# Patient Record
Sex: FEMALE
[De-identification: names, ages, dates, MRNs, and addresses within clinical notes are randomized per-mention and may not be internally consistent; named-entity substitution may affect disease eponyms.]

---

## 2018-05-17 ENCOUNTER — HOSPITAL ENCOUNTER (EMERGENCY)
Dept: HOSPITAL 97 - ER | Age: 58
Discharge: HOME | End: 2018-05-17
Payer: COMMERCIAL

## 2018-05-17 DIAGNOSIS — R07.9: Primary | ICD-10-CM

## 2018-05-17 DIAGNOSIS — Z72.0: ICD-10-CM

## 2018-05-17 DIAGNOSIS — E03.9: ICD-10-CM

## 2018-05-17 LAB
ALBUMIN SERPL BCP-MCNC: 4.5 G/DL (ref 3.2–5.5)
ALP SERPL-CCNC: 82 IU/L (ref 42–121)
ALT SERPL W P-5'-P-CCNC: 15 IU/L (ref 10–60)
AST SERPL W P-5'-P-CCNC: 19 IU/L (ref 10–42)
BUN BLD-MCNC: 16 MG/DL (ref 6–20)
GLUCOSE SERPLBLD-MCNC: 94 MG/DL (ref 65–120)
HCT VFR BLD CALC: 36.9 % (ref 36–45)
INR BLD: 1
LYMPHOCYTES # SPEC AUTO: 1.2 K/UL (ref 0.7–4.9)
MAGNESIUM SERPL-MCNC: 1.9 MG/DL (ref 1.8–2.5)
MCH RBC QN AUTO: 32.1 PG (ref 27–35)
MCV RBC: 94.1 FL (ref 80–100)
PMV BLD: 9.4 FL (ref 7.6–11.3)
POTASSIUM SERPL-SCNC: 3.2 MEQ/L (ref 3.6–5)
RBC # BLD: 3.92 M/UL (ref 3.86–4.86)

## 2018-05-17 PROCEDURE — 80076 HEPATIC FUNCTION PANEL: CPT

## 2018-05-17 PROCEDURE — 99285 EMERGENCY DEPT VISIT HI MDM: CPT

## 2018-05-17 PROCEDURE — 80048 BASIC METABOLIC PNL TOTAL CA: CPT

## 2018-05-17 PROCEDURE — 83735 ASSAY OF MAGNESIUM: CPT

## 2018-05-17 PROCEDURE — 85025 COMPLETE CBC W/AUTO DIFF WBC: CPT

## 2018-05-17 PROCEDURE — 84484 ASSAY OF TROPONIN QUANT: CPT

## 2018-05-17 PROCEDURE — 93005 ELECTROCARDIOGRAM TRACING: CPT

## 2018-05-17 PROCEDURE — 81003 URINALYSIS AUTO W/O SCOPE: CPT

## 2018-05-17 PROCEDURE — 36415 COLL VENOUS BLD VENIPUNCTURE: CPT

## 2018-05-17 PROCEDURE — 71045 X-RAY EXAM CHEST 1 VIEW: CPT

## 2018-05-17 PROCEDURE — 85610 PROTHROMBIN TIME: CPT

## 2018-05-17 PROCEDURE — 96374 THER/PROPH/DIAG INJ IV PUSH: CPT

## 2018-05-17 PROCEDURE — 83880 ASSAY OF NATRIURETIC PEPTIDE: CPT

## 2018-05-17 NOTE — ER
Nurse's Notes                                                                                     

 [facility]                                                                

Name: Massiel Givens                                                                             

Age: 57 yrs                                                                                       

Sex: Female                                                                                       

: 1960                                                                                   

MRN: [id number]                                                                                   

Arrival Date: 2018                                                                          

Time: 18:33                                                                                       

Account#: [id number]                                                                            

Bed 15                                                                                            

Private MD: out of town, doctor                                                                   

Diagnosis: Chest pain, unspecified                                                                

                                                                                                  

Presentation:                                                                                     

                                                                                             

18:39 Presenting complaint: Patient states: Sternal chest pain that started this AM, reports  aj  

      radiation to back and jaw. Took Aspirin 81 mg PTA. Transition of care: patient was not      

      received from another setting of care. Onset of symptoms was May 17, 2018. Care prior       

      to arrival: None.                                                                           

18:39 Method Of Arrival: Ambulatory                                                           aj  

18:39 Acuity: NURIA 3                                                                           aj  

18:55 Initial Sepsis Screen: Does the patient meet any 2 criteria? No. Patient's initial      hj  

      sepsis screen is negative. Does the patient have a suspected source of infection? No.       

      Patient's initial sepsis screen is negative.                                                

                                                                                                  

Triage Assessment:                                                                                

18:40 General: Appears in no apparent distress. comfortable, Behavior is calm, cooperative,   aj  

      appropriate for age. Pain: Complains of pain in chest Pain radiates to back. Neuro:         

      Level of Consciousness is awake, alert, obeys commands, Oriented to person, place,          

      time, situation, Appropriate for age. Cardiovascular: Reports chest pain.                   

      Cardiovascular: Reports nausea, Capillary refill < 3 seconds in bilateral fingers           

      Patient's skin is warm and dry. Respiratory: Airway is patent Respiratory effort is         

      even, unlabored, Respiratory pattern is regular, symmetrical. GI: Reports nausea. Derm:     

      Skin is intact, is healthy with good turgor, Skin is pink, warm \T\ dry. normal.            

                                                                                                  

OB/GYN:                                                                                           

18:40 LMP N/A - Post-menopause                                                                aj  

                                                                                                  

Historical:                                                                                       

- Allergies:                                                                                      

18:40 No Known Allergies;                                                                     aj  

- Home Meds:                                                                                      

18:40 Synthroid Oral [Active];                                                                aj  

- PMHx:                                                                                           

18:40 Hypothyroidism;                                                                         aj  

- PSHx:                                                                                           

18:40 ; Cholecystectomy; Appendectomy;                                               aj  

                                                                                                  

- Immunization history:: Adult Immunizations up to date.                                          

- Social history:: Smoking status: Patient uses tobacco products, denies chronic                  

  smoking, but will smoke occasionally.                                                           

                                                                                                  

                                                                                                  

Screenin:55 Abuse screen: Denies threats or abuse. Denies injuries from another. Nutritional        em  

      screening: No deficits noted. Tuberculosis screening: No symptoms or risk factors           

      identified. Fall Risk None identified.                                                      

                                                                                                  

Assessment:                                                                                       

18:56 Pain: Pain began 1 day ago.                                                             hj  

18:57 General: Appears in no apparent distress. uncomfortable, Behavior is calm, cooperative, hj  

      appropriate for age. Pain: Complains of pain in chest. Pain: Pain radiates to back.         

      Neuro: Level of Consciousness is awake, alert, obeys commands, Oriented to person,          

      place, time, situation, Appropriate for age Cardiovascular: Chest pain. Respiratory:        

      Airway is patent Respiratory effort is even, unlabored, Respiratory pattern is regular,     

      symmetrical. GI: No signs and/or symptoms were reported involving the gastrointestinal      

      system. : No signs and/or symptoms were reported regarding the genitourinary system.      

      EENT: No signs and/or symptoms were reported regarding the EENT system. Derm: No signs      

      and/or symptoms reported regarding the dermatologic system. Musculoskeletal: No signs       

      and/or symptoms reported regarding the musculoskeletal system.                              

19:10 Reassessment: Report received from HUGO Wood.                                           bs1 

19:10 General: Appears in no apparent distress. uncomfortable, Behavior is cooperative,       bs1 

      anxious. Pain: Complains of pain in back and chest Pain radiates to back. Neuro: Level      

      of Consciousness is awake, alert, obeys commands, Oriented to person, place, time,          

      situation, Appropriate for age. Cardiovascular: Reports chest pain, Heart tones S1 S2       

      present Capillary refill < 3 seconds Patient's skin is warm and dry. Chest pain.            

      Respiratory: Airway is patent Trachea midline Respiratory effort is even, unlabored,        

      Respiratory pattern is regular, symmetrical, Breath sounds are clear bilaterally. GI:       

      No signs and/or symptoms were reported involving the gastrointestinal system. Abdomen       

      is round non-distended. : No signs and/or symptoms were reported regarding the            

      genitourinary system. EENT: No signs and/or symptoms were reported regarding the EENT       

      system. Derm: Skin is intact, Skin is pink, warm \T\ dry. Musculoskeletal: Circulation,     

      motion, and sensation intact. Capillary refill < 3 seconds, Range of motion: intact in      

      all extremities.                                                                            

20:29 Reassessment: Patient appears in no apparent distress at this time. Patient and/or      bs1 

      family updated on plan of care and expected duration. Pain level reassessed. Patient is     

      alert, oriented x 3, equal unlabored respirations, skin warm/dry/pink. Patient appears      

      anxious on pending results. Informed patient of POC, sending labs and that the Provider     

      will come and talk to patient regarding results.                                            

22:15 Reassessment: Patient appears in no apparent distress at this time. Patient and/or      bs1 

      family updated on plan of care and expected duration. Pain level reassessed. Patient is     

      alert, oriented x 3, equal unlabored respirations, skin warm/dry/pink. No reports of        

      chest pain.                                                                                 

                                                                                                  

Vital Signs:                                                                                      

18:40  / 69; Pulse 80; Resp 16; Temp 98.1; Pulse Ox 98% on R/A; Weight 77.11 kg; Height aj  

      5 ft. 3 in. (160.02 cm);                                                                    

19:40  / 79; Pulse 72; Pulse Ox 99% on R/A;                                             bs1 

20:15  / 70; Pulse 74; Resp 16; Pulse Ox 99% on R/A; Pain 0/10;                         bs1 

21:15  / 74; Pulse 69; Resp 20; Pulse Ox 99% on R/A;                                    bs1 

22:27  / 68; Pulse 72; Resp 21 S; Temp 97.9(O); Pulse Ox 98% on R/A; Pain 0/10;         bs1 

18:40 Body Mass Index 30.11 (77.11 kg, 160.02 cm)                                               

                                                                                                  

ED Course:                                                                                        

18:33 Patient arrived in ED.                                                                  mr  

18:33 out of town, doctor is Private Physician.                                               mr  

18:40 Triage completed.                                                                       aj  

18:40 Arm band placed on right wrist. Patient placed in waiting room, Patient notified of       

      wait time.                                                                                  

18:42 Israel Zhao RN is Primary Nurse.                                                    hj  

18:47 Stefano Savage PA is PHCP.                                                               jr8 

18:47 Gino Fuentes MD is Attending Physician.                                                jr8 

18:56 Cardiac monitor on. Pulse ox on. NIBP on.                                               hj  

18:56 Patient maintains SpO2 saturation greater than 95% on room air.                         hj  

18:57 Patient has correct armband on for positive identification. Placed in gown. Bed in low  hj  

      position. Call light in reach. Side rails up X 1. Adult w/ patient.                         

19:18 Inserted saline lock: 20 gauge in right forearm, using aseptic technique. By sal Major1 

      Christie.                                                                                     

19:21 Primary Nurse role handed off by Pavel, Israel, RN                                     rg2 

19:31 Wen Morton, RN is Primary Nurse.                                                 bs1 

20:02 X-ray completed. Portable x-ray completed in exam room. Patient tolerated procedure     kc2 

      well.                                                                                       

20:03 XRAY Chest (1 view) In Process Unspecified.                                             EDMS

21:33 EKG done, by ED staff, reviewed by Stefano MARTINEZ repeat EKG. Repeat lab(s) drawn. by   bs1 

      me, sent to lab. Repeat troponin.                                                           

22:26 No provider procedures requiring assistance completed. IV discontinued, bleeding        bs1 

      controlled, No redness/swelling at site. Pressure dressing applied.                         

                                                                                                  

Administered Medications:                                                                         

21:47 Drug: Pepcid 20 mg Route: IVP; Site: right antecubital;                                 ea  

22:01 Follow up: Response: No adverse reaction                                                bs1 

                                                                                                  

                                                                                                  

Outcome:                                                                                          

22:11 Discharge ordered by MD.                                                                jr8 

22:26 Discharged to home ambulatory, with significant other.                                  bs1 

22:26 Condition: stable                                                                           

22:26 Discharge instructions given to patient, Instructed on discharge instructions, follow       

      up and referral plans. medication usage, Demonstrated understanding of instructions,        

      follow-up care, medications, Prescriptions given X 1, prescription for prednisone 20mg      

      tab po daily for 5 days.                                                                    

22:31 Patient left the ED.                                                                    bs1 

                                                                                                  

Signatures:                                                                                       

Dispatcher [employer]                           EDMS                                                 

Yue Louise                               rg2                                                  

Kenisha Lloyd RN RN aj Rivera, Maria                                                                                   

Clarke, Rik, LVN                       LVN  Stefano Wen PA PA   jr8                                                  

Israel Zhao, RN                      Pati Oreilly                                 kc2                                                  

Rajani June RN RN ea Salazar, Brittany, HUGO ARIAS   bs1                                                  

                                                                                                  

**************************************************************************************************

## 2018-05-17 NOTE — RAD REPORT
EXAM DESCRIPTION:  RAD - Chest Single View - 5/17/2018 8:05 pm

 

CLINICAL HISTORY:  Chest pain

 

COMPARISON:  None.

 

TECHNIQUE:  AP portable chest image was obtained 2003 hours .

 

FINDINGS:  Lungs are clear. Heart and vasculature are normal. No measurable pleural effusion and no p
neumothorax. No gross bony abnormality seen. No acute aortic findings suspected.

 

IMPRESSION:  No acute cardiopulmonary process.

## 2018-05-17 NOTE — EDPHYS
Physician Documentation                                                                           

 [facility]                                                                

Name: Massiel Givens                                                                             

Age: 57 yrs                                                                                       

Sex: Female                                                                                       

: 1960                                                                                   

MRN: [id number]                                                                                   

Arrival Date: 2018                                                                          

Time: 18:33                                                                                       

Account#: [id number]                                                                            

Bed 15                                                                                            

Private MD: out of town, doctor                                                                   

ED Physician Gino Fuentes                                                                         

HPI:                                                                                              

                                                                                             

20:27 This 57 yrs old  Female presents to ER via Ambulatory with complaints of Chest  jr8 

      Pain.                                                                                       

20:27 The patient or guardian reports chest pain that is located primarily in the substernal  jr8 

      area. Onset: acutely, this morning, today. The pain radiates to jaw, back. Associated       

      signs and symptoms: The patient has no apparent associated signs or symptoms. The chest     

      pain is described as burning. Duration: The patient or guardian reports a single            

      episode. Modifying factors: The symptoms are alleviated by nothing. the symptoms are        

      aggravated by nothing. Severity of pain: At its worst the pain was mild in the              

      emergency department the pain is unchanged. The patient has not experienced similar         

      symptoms in the past. The patient has not recently seen a physician.                        

                                                                                                  

OB/GYN:                                                                                           

18:40 LMP N/A - Post-menopause                                                                aj  

                                                                                                  

Historical:                                                                                       

- Allergies:                                                                                      

18:40 No Known Allergies;                                                                     aj  

- Home Meds:                                                                                      

18:40 Synthroid Oral [Active];                                                                aj  

- PMHx:                                                                                           

18:40 Hypothyroidism;                                                                         aj  

- PSHx:                                                                                           

18:40 ; Cholecystectomy; Appendectomy;                                               aj  

                                                                                                  

- Immunization history:: Adult Immunizations up to date.                                          

- Social history:: Smoking status: Patient uses tobacco products, denies chronic                  

  smoking, but will smoke occasionally.                                                           

                                                                                                  

                                                                                                  

ROS:                                                                                              

20:27 Eyes: Negative for injury, pain, redness, and discharge, ENT: Negative for injury,      jr8 

      pain, and discharge, Neck: Negative for injury, pain, and swelling, Respiratory:            

      Negative for shortness of breath, cough, wheezing, and pleuritic chest pain,                

      Abdomen/GI: Negative for abdominal pain, nausea, vomiting, diarrhea, and constipation,      

      Back: Negative for injury and pain, MS/Extremity: Negative for injury and deformity,        

      Skin: Negative for injury, rash, and discoloration, Neuro: Negative for headache,           

      weakness, numbness, tingling, and seizure.                                                  

20:27 Cardiovascular: Positive for chest pain, Negative for edema, orthopnea, palpitations,       

      paroxysmal nocturnal dyspnea.                                                               

                                                                                                  

Exam:                                                                                             

20:27 Eyes:  Pupils equal round and reactive to light, extra-ocular motions intact.  Lids and jr8 

      lashes normal.  Conjunctiva and sclera are non-icteric and not injected.  Cornea within     

      normal limits.  Periorbital areas with no swelling, redness, or edema. ENT:  Nares          

      patent. No nasal discharge, no septal abnormalities noted.  Tympanic membranes are          

      normal and external auditory canals are clear.  Oropharynx with no redness, swelling,       

      or masses, exudates, or evidence of obstruction, uvula midline.  Mucous membranes           

      moist. Neck:  Trachea midline, no thyromegaly or masses palpated, and no cervical           

      lymphadenopathy.  Supple, full range of motion without nuchal rigidity, or vertebral        

      point tenderness.  No Meningismus. Cardiovascular:  Regular rate and rhythm with a          

      normal S1 and S2.  No gallops, murmurs, or rubs.  Normal PMI, no JVD.  No pulse             

      deficits. Respiratory:  Lungs have equal breath sounds bilaterally, clear to                

      auscultation and percussion.  No rales, rhonchi or wheezes noted.  No increased work of     

      breathing, no retractions or nasal flaring. Abdomen/GI:  Soft, non-tender, with normal      

      bowel sounds.  No distension or tympany.  No guarding or rebound.  No evidence of           

      tenderness throughout. Back:  No spinal tenderness.  No costovertebral tenderness.          

      Full range of motion. Skin:  Warm, dry with normal turgor.  Normal color with no            

      rashes, no lesions, and no evidence of cellulitis. MS/ Extremity:  Pulses equal, no         

      cyanosis.  Neurovascular intact.  Full, normal range of motion. Neuro:  Awake and           

      alert, GCS 15, oriented to person, place, time, and situation.  Cranial nerves II-XII       

      grossly intact.  Motor strength 5/5 in all extremities.  Sensory grossly intact.            

      Cerebellar exam normal.  Normal gait.                                                       

                                                                                                  

Vital Signs:                                                                                      

18:40  / 69; Pulse 80; Resp 16; Temp 98.1; Pulse Ox 98% on R/A; Weight 77.11 kg; Height aj  

      5 ft. 3 in. (160.02 cm);                                                                    

19:40  / 79; Pulse 72; Pulse Ox 99% on R/A;                                             bs1 

20:15  / 70; Pulse 74; Resp 16; Pulse Ox 99% on R/A; Pain 0/10;                         bs1 

21:15  / 74; Pulse 69; Resp 20; Pulse Ox 99% on R/A;                                    bs1 

22:27  / 68; Pulse 72; Resp 21 S; Temp 97.9(O); Pulse Ox 98% on R/A; Pain 0/10;         bs1 

18:40 Body Mass Index 30.11 (77.11 kg, 160.02 cm)                                             aj  

                                                                                                  

MDM:                                                                                              

18:47 Patient medically screened.                                                             jr8 

22:10 Differential diagnosis: acute myocardial infarction, acute pericarditis, anxiety, chest jr8 

      wall pain, costochondritis, esophagitis, gastritis, gastroesophageal reflux disease         

      (GERD), pancreatitis, pneumonia, pulmonary embolus, stable angina, thoracic aortic          

      disection, unstable angina. The patient was not given aspirin in the Emergency              

      Department. Patient reports taking aspirin within the past 24 hours. Data reviewed:         

      vital signs, nurses notes, lab test result(s), EKG, radiologic studies, plain films,        

      and as a result, I will discharge patient. Data interpreted: Pulse oximetry: on room        

      air is 99 %. Interpretation: normal. Counseling: I had a detailed discussion with the       

      patient and/or guardian regarding: the historical points, exam findings, and any            

      diagnostic results supporting the discharge/admit diagnosis, lab results, radiology         

      results, the need for outpatient follow up, a cardiologist, a family practitioner, to       

      return to the emergency department if symptoms worsen or persist or if there are any        

      questions or concerns that arise at home.                                                   

                                                                                                  

                                                                                             

19:18 Order name: Urine Dipstick--Ancillary (enter results); Complete Time: 21:29               

                                                                                             

19:28 Order name: Basic Metabolic Panel; Complete Time: 20:05                                 [facility] 

                                                                                             

19:28 Order name: BNP; Complete Time: 20:20                                                   [facility] 

                                                                                             

19:28 Order name: CBC with Diff; Complete Time: 20:05                                         [facility] 

                                                                                             

19:28 Order name: LFT's; Complete Time: 20:05                                                 [facility] 

                                                                                             

19:28 Order name: Magnesium; Complete Time: 20:05                                             [facility] 

                                                                                             

18:52 Order name: EKG; Complete Time: 18:53                                                     

                                                                                             

19:28 Order name: EKG - Nurse/Tech; Complete Time: 19:31                                      [facility] 

                                                                                             

19:28 Order name: PT-INR; Complete Time: 20:05                                                [facility] 

                                                                                             

19:28 Order name: Troponin (emerg Dept Use Only); Complete Time: 20:05                                                                                                                     

19:28 Order name: XRAY Chest (1 view); Complete Time: 20:20                                                                                                                                

19:28 Order name: Cardiac monitoring; Complete Time: :                                                                                             

21:13 Order name: Troponin (emerg Dept Use Only); Complete Time: 22:10                                                                                                                     

19:28 Order name: IV Saline Lock; Complete Time: :                                                                                             

19:28 Order name: Labs collected and sent; Complete Time: :                                                                                             

19:28 Order name: O2 Per Protocol; Complete Time: :                                                                                             

19:28 Order name: O2 Sat Monitoring; Complete Time: :                                                                                             

19:28 Order name: Urine Dipstick-Ancillary (obtain specimen); Complete Time: :                                                                                             

21:28 Order name: EKG - Nurse/Tech; Complete Time: 22:01                                       

                                                                                                  

Administered Medications:                                                                         

21:47 Drug: Pepcid 20 mg Route: IVP; Site: right antecubital;                                 ea  

22:01 Follow up: Response: No adverse reaction                                                bs1 

                                                                                                  

                                                                                                  

Disposition:                                                                                      

18 22:11 Discharged to Home. Impression: Chest pain, unspecified.                           

- Condition is Stable.                                                                            

- Discharge Instructions: Nonspecific Chest Pain, Gastroesophageal Reflux Disease,                

  Adult, Aspirin and Your Heart.                                                                  

- Prescriptions for Prednisone 20 mg Oral Tablet - take 1 tablet by ORAL route once               

  daily for 5 days; 5 tablet.                                                                     

- Medication Reconciliation Form, Thank You Letter, Antibiotic Education, Prescription            

  Opioid Use form.                                                                                

- Follow up: Private Physician; When: 2 - 3 days; Reason: Recheck today's complaints,             

  Continuance of care, Re-evaluation by your physician.                                           

- Problem is new.                                                                                 

- Symptoms have improved.                                                                         

                                                                                                  

                                                                                                  

                                                                                                  

Addendum:                                                                                         

2018                                                                                        

     08:07 Co-signature as Attending Physician, Gino Fuentes MD.                                    r
n

                                                                                                  

Signatures:                                                                                       

Dispatcher [employer]                           Kenisha Calix RN                       Gino Schafer MD MD rn Roszak, Josh, PA                        PA   jr8                                                  

Rajani June RN RN ea Salazar, Brittany RN                   RN   bs1                                                  

                                                                                                  

Corrections: (The following items were deleted from the chart)                                    

                                                                                             

22:31 22:11 2018 22:11 Discharged to Home. Impression: Chest pain, unspecified.         bs1 

      Condition is Stable. Forms are Medication Reconciliation Form, Thank You Letter,            

      Antibiotic Education, Prescription Opioid Use. Follow up: Private Physician; When: 2 -      

      3 days; Reason: Recheck today's complaints, Continuance of care, Re-evaluation by your      

      physician. Problem is new. Symptoms have improved. jr8                                      

                                                                                                  

**************************************************************************************************

## 2018-05-18 NOTE — EKG
Test Date:    2018-05-17               Test Time:    18:49:18

Technician:   XU                                     

                                                     

MEASUREMENT RESULTS:                                       

Intervals:                                           

Rate:         74                                     

NH:           164                                    

QRSD:         82                                     

QT:           398                                    

QTc:          441                                    

Axis:                                                

P:            50                                     

NH:           164                                    

QRS:          -10                                    

T:            12                                     

                                                     

INTERPRETIVE STATEMENTS:                                       

                                                     

Normal sinus rhythm

Cannot rule out Anterior infarct, age undetermined

Abnormal ECG

No previous ECG available for comparison



Electronically Signed On 05-18-18 06:25:52 CDT by Choco Stone

## 2018-05-18 NOTE — EKG
Test Date:    2018-05-17               Test Time:    21:46:46

Technician:   ESTEVAN                                     

                                                     

MEASUREMENT RESULTS:                                       

Intervals:                                           

Rate:         64                                     

WV:           174                                    

QRSD:         84                                     

QT:           418                                    

QTc:          431                                    

Axis:                                                

P:            47                                     

WV:           174                                    

QRS:          -8                                     

T:            10                                     

                                                     

INTERPRETIVE STATEMENTS:                                       

                                                     

Normal sinus rhythm

Cannot rule out Anterior infarct, age undetermined

Abnormal ECG

Compared to ECG 05/17/2018 18:49:18

No significant changes



Electronically Signed On 05-18-18 15:40:59 CDT by Choco Stone